# Patient Record
Sex: FEMALE | Race: ASIAN | Employment: UNEMPLOYED | URBAN - METROPOLITAN AREA
[De-identification: names, ages, dates, MRNs, and addresses within clinical notes are randomized per-mention and may not be internally consistent; named-entity substitution may affect disease eponyms.]

---

## 2023-01-25 LAB
HEPATITIS B SURFACE ANTIGEN OB: NEGATIVE
HIV RESULT OB: NEGATIVE
RH FACTOR OB: POSITIVE

## 2023-03-27 ENCOUNTER — OFFICE VISIT (OUTPATIENT)
Dept: PERINATAL CARE | Facility: HOSPITAL | Age: 28
End: 2023-03-27
Attending: OBSTETRICS & GYNECOLOGY
Payer: COMMERCIAL

## 2023-03-27 VITALS
SYSTOLIC BLOOD PRESSURE: 105 MMHG | WEIGHT: 131 LBS | DIASTOLIC BLOOD PRESSURE: 69 MMHG | HEIGHT: 63 IN | BODY MASS INDEX: 23.21 KG/M2 | HEART RATE: 82 BPM

## 2023-03-27 DIAGNOSIS — Z87.74 HX OF CONGENITAL HEART DISEASE: ICD-10-CM

## 2023-03-27 DIAGNOSIS — Z87.74 HX OF CONGENITAL HEART DISEASE: Primary | ICD-10-CM

## 2023-03-27 DIAGNOSIS — Z82.79 FAMILY HISTORY OF CONGENITAL HEART DEFECT: ICD-10-CM

## 2023-03-27 PROCEDURE — 76811 OB US DETAILED SNGL FETUS: CPT | Performed by: OBSTETRICS & GYNECOLOGY

## 2023-03-27 RX ORDER — MELATONIN
325
COMMUNITY

## 2023-03-27 RX ORDER — CHOLECALCIFEROL (VITAMIN D3) 25 MCG
1 TABLET,CHEWABLE ORAL DAILY
COMMUNITY

## 2023-05-23 LAB — HIV RESULT OB: NEGATIVE

## 2023-07-14 ENCOUNTER — OFFICE VISIT (OUTPATIENT)
Dept: PERINATAL CARE | Facility: HOSPITAL | Age: 28
End: 2023-07-14
Attending: OBSTETRICS & GYNECOLOGY
Payer: COMMERCIAL

## 2023-07-14 VITALS
HEIGHT: 63 IN | WEIGHT: 142 LBS | BODY MASS INDEX: 25.16 KG/M2 | SYSTOLIC BLOOD PRESSURE: 107 MMHG | DIASTOLIC BLOOD PRESSURE: 68 MMHG | HEART RATE: 93 BPM

## 2023-07-14 DIAGNOSIS — Z82.79 FAMILY HISTORY OF CONGENITAL HEART DEFECT: ICD-10-CM

## 2023-07-14 DIAGNOSIS — O36.5931 IUGR (INTRAUTERINE GROWTH RESTRICTION) AFFECTING CARE OF MOTHER, THIRD TRIMESTER, FETUS 1: ICD-10-CM

## 2023-07-14 DIAGNOSIS — O36.5931 IUGR (INTRAUTERINE GROWTH RESTRICTION) AFFECTING CARE OF MOTHER, THIRD TRIMESTER, FETUS 1: Primary | ICD-10-CM

## 2023-07-14 PROBLEM — O36.5930 POOR FETAL GROWTH AFFECTING MANAGEMENT OF MOTHER IN THIRD TRIMESTER: Status: ACTIVE | Noted: 2023-07-14

## 2023-07-14 PROCEDURE — 76816 OB US FOLLOW-UP PER FETUS: CPT | Performed by: OBSTETRICS & GYNECOLOGY

## 2023-07-14 PROCEDURE — 76819 FETAL BIOPHYS PROFIL W/O NST: CPT

## 2023-07-14 PROCEDURE — 76820 UMBILICAL ARTERY ECHO: CPT

## 2023-07-18 LAB — STREP GP B CULT OB: NEGATIVE

## 2023-07-26 ENCOUNTER — TELEPHONE (OUTPATIENT)
Dept: OBGYN UNIT | Facility: HOSPITAL | Age: 28
End: 2023-07-26

## 2023-08-03 ENCOUNTER — ANESTHESIA EVENT (OUTPATIENT)
Dept: OBGYN UNIT | Facility: HOSPITAL | Age: 28
End: 2023-08-03
Payer: COMMERCIAL

## 2023-08-03 ENCOUNTER — HOSPITAL ENCOUNTER (INPATIENT)
Facility: HOSPITAL | Age: 28
LOS: 1 days | Discharge: HOME | End: 2023-08-04
Attending: STUDENT IN AN ORGANIZED HEALTH CARE EDUCATION/TRAINING PROGRAM | Admitting: STUDENT IN AN ORGANIZED HEALTH CARE EDUCATION/TRAINING PROGRAM
Payer: COMMERCIAL

## 2023-08-03 ENCOUNTER — ANESTHESIA (OUTPATIENT)
Dept: OBGYN UNIT | Facility: HOSPITAL | Age: 28
End: 2023-08-03
Payer: COMMERCIAL

## 2023-08-03 PROBLEM — Z34.90 PREGNANCY: Status: ACTIVE | Noted: 2023-08-03

## 2023-08-03 LAB
ANTIBODY SCREEN: NEGATIVE
BASOPHILS # BLD AUTO: 0.05 X10(3) UL (ref 0–0.2)
BASOPHILS NFR BLD AUTO: 0.4 %
EOSINOPHIL # BLD AUTO: 0.09 X10(3) UL (ref 0–0.7)
EOSINOPHIL NFR BLD AUTO: 0.8 %
ERYTHROCYTE [DISTWIDTH] IN BLOOD BY AUTOMATED COUNT: 13.5 %
HCT VFR BLD AUTO: 35.2 %
HGB BLD-MCNC: 12.1 G/DL
IMM GRANULOCYTES # BLD AUTO: 0.46 X10(3) UL (ref 0–1)
IMM GRANULOCYTES NFR BLD: 4.1 %
LYMPHOCYTES # BLD AUTO: 1.59 X10(3) UL (ref 1–4)
LYMPHOCYTES NFR BLD AUTO: 14.1 %
MCH RBC QN AUTO: 31.2 PG (ref 26–34)
MCHC RBC AUTO-ENTMCNC: 34.4 G/DL (ref 31–37)
MCV RBC AUTO: 90.7 FL
MONOCYTES # BLD AUTO: 0.88 X10(3) UL (ref 0.1–1)
MONOCYTES NFR BLD AUTO: 7.8 %
NEUTROPHILS # BLD AUTO: 8.19 X10 (3) UL (ref 1.5–7.7)
NEUTROPHILS # BLD AUTO: 8.19 X10(3) UL (ref 1.5–7.7)
NEUTROPHILS NFR BLD AUTO: 72.8 %
PLATELET # BLD AUTO: 290 10(3)UL (ref 150–450)
RBC # BLD AUTO: 3.88 X10(6)UL
RH BLOOD TYPE: POSITIVE
T PALLIDUM AB SER QL IA: NONREACTIVE
WBC # BLD AUTO: 11.3 X10(3) UL (ref 4–11)

## 2023-08-03 PROCEDURE — 0UQKXZZ REPAIR HYMEN, EXTERNAL APPROACH: ICD-10-PCS | Performed by: STUDENT IN AN ORGANIZED HEALTH CARE EDUCATION/TRAINING PROGRAM

## 2023-08-03 PROCEDURE — 86901 BLOOD TYPING SEROLOGIC RH(D): CPT | Performed by: STUDENT IN AN ORGANIZED HEALTH CARE EDUCATION/TRAINING PROGRAM

## 2023-08-03 PROCEDURE — 86850 RBC ANTIBODY SCREEN: CPT | Performed by: STUDENT IN AN ORGANIZED HEALTH CARE EDUCATION/TRAINING PROGRAM

## 2023-08-03 PROCEDURE — 0KQM0ZZ REPAIR PERINEUM MUSCLE, OPEN APPROACH: ICD-10-PCS | Performed by: STUDENT IN AN ORGANIZED HEALTH CARE EDUCATION/TRAINING PROGRAM

## 2023-08-03 PROCEDURE — 86900 BLOOD TYPING SEROLOGIC ABO: CPT | Performed by: STUDENT IN AN ORGANIZED HEALTH CARE EDUCATION/TRAINING PROGRAM

## 2023-08-03 PROCEDURE — 3E033VJ INTRODUCTION OF OTHER HORMONE INTO PERIPHERAL VEIN, PERCUTANEOUS APPROACH: ICD-10-PCS | Performed by: STUDENT IN AN ORGANIZED HEALTH CARE EDUCATION/TRAINING PROGRAM

## 2023-08-03 PROCEDURE — 88307 TISSUE EXAM BY PATHOLOGIST: CPT | Performed by: STUDENT IN AN ORGANIZED HEALTH CARE EDUCATION/TRAINING PROGRAM

## 2023-08-03 PROCEDURE — 10907ZC DRAINAGE OF AMNIOTIC FLUID, THERAPEUTIC FROM PRODUCTS OF CONCEPTION, VIA NATURAL OR ARTIFICIAL OPENING: ICD-10-PCS | Performed by: STUDENT IN AN ORGANIZED HEALTH CARE EDUCATION/TRAINING PROGRAM

## 2023-08-03 PROCEDURE — 85025 COMPLETE CBC W/AUTO DIFF WBC: CPT | Performed by: STUDENT IN AN ORGANIZED HEALTH CARE EDUCATION/TRAINING PROGRAM

## 2023-08-03 PROCEDURE — 86780 TREPONEMA PALLIDUM: CPT | Performed by: STUDENT IN AN ORGANIZED HEALTH CARE EDUCATION/TRAINING PROGRAM

## 2023-08-03 RX ORDER — NALBUPHINE HYDROCHLORIDE 10 MG/ML
2.5 INJECTION, SOLUTION INTRAMUSCULAR; INTRAVENOUS; SUBCUTANEOUS
Status: DISCONTINUED | OUTPATIENT
Start: 2023-08-03 | End: 2023-08-03

## 2023-08-03 RX ORDER — BUPIVACAINE HYDROCHLORIDE 2.5 MG/ML
INJECTION, SOLUTION EPIDURAL; INFILTRATION; INTRACAUDAL AS NEEDED
Status: DISCONTINUED | OUTPATIENT
Start: 2023-08-03 | End: 2023-08-03 | Stop reason: SURG

## 2023-08-03 RX ORDER — ONDANSETRON 2 MG/ML
4 INJECTION INTRAMUSCULAR; INTRAVENOUS EVERY 6 HOURS PRN
Status: DISCONTINUED | OUTPATIENT
Start: 2023-08-03 | End: 2023-08-03 | Stop reason: HOSPADM

## 2023-08-03 RX ORDER — DEXTROSE, SODIUM CHLORIDE, SODIUM LACTATE, POTASSIUM CHLORIDE, AND CALCIUM CHLORIDE 5; .6; .31; .03; .02 G/100ML; G/100ML; G/100ML; G/100ML; G/100ML
INJECTION, SOLUTION INTRAVENOUS AS NEEDED
Status: DISCONTINUED | OUTPATIENT
Start: 2023-08-03 | End: 2023-08-03 | Stop reason: HOSPADM

## 2023-08-03 RX ORDER — SODIUM CHLORIDE, SODIUM LACTATE, POTASSIUM CHLORIDE, CALCIUM CHLORIDE 600; 310; 30; 20 MG/100ML; MG/100ML; MG/100ML; MG/100ML
INJECTION, SOLUTION INTRAVENOUS CONTINUOUS
Status: DISCONTINUED | OUTPATIENT
Start: 2023-08-03 | End: 2023-08-03 | Stop reason: HOSPADM

## 2023-08-03 RX ORDER — BUPIVACAINE HCL/0.9 % NACL/PF 0.25 %
5 PLASTIC BAG, INJECTION (ML) EPIDURAL AS NEEDED
Status: DISCONTINUED | OUTPATIENT
Start: 2023-08-03 | End: 2023-08-03

## 2023-08-03 RX ORDER — SIMETHICONE 80 MG
80 TABLET,CHEWABLE ORAL 3 TIMES DAILY PRN
Status: DISCONTINUED | OUTPATIENT
Start: 2023-08-03 | End: 2023-08-04

## 2023-08-03 RX ORDER — IBUPROFEN 600 MG/1
600 TABLET ORAL EVERY 6 HOURS
Status: DISCONTINUED | OUTPATIENT
Start: 2023-08-03 | End: 2023-08-04

## 2023-08-03 RX ORDER — TERBUTALINE SULFATE 1 MG/ML
0.25 INJECTION, SOLUTION SUBCUTANEOUS AS NEEDED
Status: DISCONTINUED | OUTPATIENT
Start: 2023-08-03 | End: 2023-08-03 | Stop reason: HOSPADM

## 2023-08-03 RX ORDER — DOCUSATE SODIUM 100 MG/1
100 CAPSULE, LIQUID FILLED ORAL
Status: DISCONTINUED | OUTPATIENT
Start: 2023-08-03 | End: 2023-08-04

## 2023-08-03 RX ORDER — IBUPROFEN 600 MG/1
600 TABLET ORAL ONCE AS NEEDED
Status: DISCONTINUED | OUTPATIENT
Start: 2023-08-03 | End: 2023-08-03 | Stop reason: HOSPADM

## 2023-08-03 RX ORDER — TRISODIUM CITRATE DIHYDRATE AND CITRIC ACID MONOHYDRATE 500; 334 MG/5ML; MG/5ML
30 SOLUTION ORAL AS NEEDED
Status: DISCONTINUED | OUTPATIENT
Start: 2023-08-03 | End: 2023-08-03 | Stop reason: HOSPADM

## 2023-08-03 RX ORDER — ACETAMINOPHEN 500 MG
1000 TABLET ORAL EVERY 6 HOURS PRN
Status: DISCONTINUED | OUTPATIENT
Start: 2023-08-03 | End: 2023-08-03 | Stop reason: HOSPADM

## 2023-08-03 RX ORDER — ACETAMINOPHEN 500 MG
500 TABLET ORAL EVERY 6 HOURS PRN
Status: DISCONTINUED | OUTPATIENT
Start: 2023-08-03 | End: 2023-08-03 | Stop reason: HOSPADM

## 2023-08-03 RX ORDER — BISACODYL 10 MG
10 SUPPOSITORY, RECTAL RECTAL ONCE AS NEEDED
Status: DISCONTINUED | OUTPATIENT
Start: 2023-08-03 | End: 2023-08-04

## 2023-08-03 RX ORDER — ACETAMINOPHEN 500 MG
1000 TABLET ORAL EVERY 6 HOURS PRN
Status: DISCONTINUED | OUTPATIENT
Start: 2023-08-03 | End: 2023-08-04

## 2023-08-03 RX ORDER — ACETAMINOPHEN 500 MG
500 TABLET ORAL EVERY 6 HOURS PRN
Status: DISCONTINUED | OUTPATIENT
Start: 2023-08-03 | End: 2023-08-04

## 2023-08-03 RX ADMIN — BUPIVACAINE HYDROCHLORIDE 8 ML: 2.5 INJECTION, SOLUTION EPIDURAL; INFILTRATION; INTRACAUDAL at 11:41:00

## 2023-08-03 NOTE — PLAN OF CARE
Problem: BIRTH - VAGINAL/ SECTION  Goal: Fetal and maternal status remain reassuring during the birth process  Description: INTERVENTIONS:  - Monitor vital signs  - Monitor fetal heart rate  - Monitor uterine activity  - Monitor labor progression (vaginal delivery)  - DVT prophylaxis (C/S delivery)  - Surgical antibiotic prophylaxis (C/S delivery)  8/3/2023 1803 by Anahi Moreno RN  Outcome: Completed  8/3/2023 0951 by Anahi Moreno RN  Outcome: Progressing     Problem: PAIN - ADULT  Goal: Verbalizes/displays adequate comfort level or patient's stated pain goal  Description: INTERVENTIONS:  - Encourage pt to monitor pain and request assistance  - Assess pain using appropriate pain scale  - Administer analgesics based on type and severity of pain and evaluate response  - Implement non-pharmacological measures as appropriate and evaluate response  - Consider cultural and social influences on pain and pain management  - Manage/alleviate anxiety  - Utilize distraction and/or relaxation techniques  - Monitor for opioid side effects  - Notify MD/LIP if interventions unsuccessful or patient reports new pain  - Anticipate increased pain with activity and pre-medicate as appropriate  8/3/2023 1803 by Anahi Moreno RN  Outcome: Completed  8/3/2023 0951 by Anahi Moreno RN  Outcome: Progressing     Problem: ANXIETY  Goal: Will report anxiety at manageable levels  Description: INTERVENTIONS:  - Administer medication as ordered  - Teach and rehearse alternative coping skills  - Provide emotional support with 1:1 interaction with staff  8/3/2023 1803 by Anahi Moreno RN  Outcome: Completed  8/3/2023 0951 by Anahi Moreno RN  Outcome: Progressing     Problem: Patient/Family Goals  Goal: Patient/Family Long Term Goal  Description: Patient's Long Term Goal: to have adequate pain management     Interventions:  -   - See additional Care Plan goals for specific interventions  8/3/2023 1803 by Serena Alvarenga Carolynn Mccauley RN  Outcome: Completed  8/3/2023 0951 by Scarlet Salmon RN  Outcome: Progressing  Goal: Patient/Family Short Term Goal  Description: Patient's Short Term Goal: to have an uncomplicated vaginal delivery     Interventions:   -   - See additional Care Plan goals for specific interventions  8/3/2023 1803 by Scarlet Salmon RN  Outcome: Completed  8/3/2023 0951 by Scarlet Salmon RN  Outcome: Progressing

## 2023-08-03 NOTE — ANESTHESIA PROCEDURE NOTES
Labor Analgesia    Date/Time: 8/3/2023 11:29 AM    Performed by: Jessica Currie MD  Authorized by: Jessica Currie MD      General Information and Staff    Start Time:  8/3/2023 11:29 AM  End Time:  8/3/2023 11:42 AM  Anesthesiologist:  Jessica Currie MD  Performed by:   Anesthesiologist  Patient Location:  OB  Site Identification: surface landmarks    Reason for Block: labor epidural    Preanesthetic Checklist: patient identified, IV checked, risks and benefits discussed, monitors and equipment checked, pre-op evaluation, timeout performed, IV bolus, anesthesia consent and sterile technique used      Procedure Details    Patient Position:  Sitting  Prep: ChloraPrep    Monitoring:  Heart rate and continuous pulse ox  Approach:  Midline    Epidural Needle    Injection Technique:  BRIANNA air  Needle Type:  Tuohy  Needle Gauge:  17 G  Needle Length:  3.375 in  Needle Insertion Depth:  3  Location:  L3-4    Spinal Needle      Catheter    Catheter Type:  End hole  Catheter Size:  19 G  Test Dose:  Negative    Assessment      Additional Comments

## 2023-08-03 NOTE — PLAN OF CARE
Problem: BIRTH - VAGINAL/ SECTION  Goal: Fetal and maternal status remain reassuring during the birth process  Description: INTERVENTIONS:  - Monitor vital signs  - Monitor fetal heart rate  - Monitor uterine activity  - Monitor labor progression (vaginal delivery)  - DVT prophylaxis (C/S delivery)  - Surgical antibiotic prophylaxis (C/S delivery)  Outcome: Progressing     Problem: PAIN - ADULT  Goal: Verbalizes/displays adequate comfort level or patient's stated pain goal  Description: INTERVENTIONS:  - Encourage pt to monitor pain and request assistance  - Assess pain using appropriate pain scale  - Administer analgesics based on type and severity of pain and evaluate response  - Implement non-pharmacological measures as appropriate and evaluate response  - Consider cultural and social influences on pain and pain management  - Manage/alleviate anxiety  - Utilize distraction and/or relaxation techniques  - Monitor for opioid side effects  - Notify MD/LIP if interventions unsuccessful or patient reports new pain  - Anticipate increased pain with activity and pre-medicate as appropriate  Outcome: Progressing     Problem: ANXIETY  Goal: Will report anxiety at manageable levels  Description: INTERVENTIONS:  - Administer medication as ordered  - Teach and rehearse alternative coping skills  - Provide emotional support with 1:1 interaction with staff  Outcome: Progressing     Problem: Patient/Family Goals  Goal: Patient/Family Long Term Goal  Description: Patient's Long Term Goal: to have adequate pain management     Interventions:  -   - See additional Care Plan goals for specific interventions  Outcome: Progressing  Goal: Patient/Family Short Term Goal  Description: Patient's Short Term Goal: to have an uncomplicated vaginal delivery     Interventions:   -   - See additional Care Plan goals for specific interventions  Outcome: Progressing

## 2023-08-03 NOTE — L&D DELIVERY NOTE
[facility] [[id number]]      Labor Events     labor?: No   steroids?: None  Antibiotics received during labor?: No  Rupture date/time: 8/3/2023 1040     Rupture type: AROM  Fluid color: Clear  Labor type: Induced Onset of Labor  Induction: Oxytocin, AROM  Indications for induction: Suspected IUGR  Intrapartum & labor complications: None       Labor Length    1st stage: 4h 22m  2nd stage: 0h 59m  3rd stage: 0h 03m       Labor Event Times    Labor onset date/time: 8/3/2023 1152  Dilation complete date/time: 8/3/2023 1615  Start pushing date/time: 8/3/2023 1620        Presentation    Presentation: Vertex  Position: Left Occiput Anterior       Operative Delivery    Operative Vaginal Delivery: No               Shoulder Dystocia    Shoulder Dystocia: No       Anesthesia    Method: Epidural              [place] Delivery      Delivery date/time:  8/3/23 17:14:00   Delivery type: Normal spontaneous vaginal delivery    Details:            Delivery location: delivery room          Delivery Providers    Delivering Clinician: Yolanda Yañez MD   Delivery personnel:  Provider Role   Burak Patel RN Baby Nurse   Amber Carias RN Delivery Nurse             Cord    Vessels: 3 Vessels  Complications: None  Timed cord clamping: Yes  Time in sec: 30  Cord blood disposition: to lab  Gases sent?: No       Resuscitation    Method: None        Measurements      Weight: 2940 g 6 lb 7.7 oz Length: 50.8 cm     Head circum. : 34 cm Chest circum.: 30 cm      Abdominal circum.: 29.5 cm           Placenta    Date/time: 8/3/2023 1717  Removal: Spontaneous  Appearance: Intact  Disposition: Pathology       Apgars    Living status: Living   Apgar Scoring Key:    0 1 2    Skin color Blue or pale Acrocyanotic Completely pink    Heart rate Absent <100 bpm >100 bpm    Reflex irritability No response Grimace Cry or active withdrawal    Muscle tone Limp Some flexion Active motion    Respiratory effort Absent Weak cry; hypoventilation Good, crying                1 Minute:  5 Minute:  10 Minute:  15 Minute:  20 Minute:      Skin color: 0  1       Heart rate: 2  2       Reflex irritablity: 2  2       Muscle tone: 2  2       Respiratory effort: 2  2       Total: 8  9               Apgars assigned by: LISA BENÍTEZ RN     [place] disposition: with mother             Skin to Skin    Skin to skin initiated date/time: 8/3/2023 1750  Skin to skin with: Mother       Vaginal Count    Initial count RN: Jorge Lynne RN  Initial count Tech: Patricia Reeder   Svitlana    Initial counts 11   0    Final counts 11   2    Final count RN: Jorge Lynne RN  Final count MD: Nirali Overton MD       Delivery (Maternal)    Episiotomy: None  Perineal lacerations: 2nd Repaired?: Yes     Vaginal laceration?: Yes Repaired?: Yes   Quantitative blood loss (mL): 141                                                                      Vaginal Delivery Note          Meridee Loss Patient Status:  Inpatient    2/15/1995 MRN [id number]   Location [facility] Attending Nirali Overton MD   Hosp Day # 0 PCP No primary care provider on file. Preprocedure Dx:  IUP at 38w4d, IOL for IUGR with AC 4%, history of congenital heart defect s/p surgical repair at 11years old, low weight gain in pregnancy, anemia on oral iron    Post Procedure Dx: Same - delivered, velamentous cord    Procedure: Normal Spontaneous Vaginal Delivery    Physician: Raphael Mohamud MD    Anesthesia: Epidural    QBL: 141 cc    Findings:   1) A viable male infant with Apgars of 8 and 9 weighing 6lbs 7.7 oz was delivered in ANA position. 2) 3 vessel cord.    3) Normal appearing placenta spontaneously delivered intact, velamentous cord   4) 2nd degree laceration, bilateral vaginal lacerations of the hymenal ring at 1 o'clock and 10 o'clock    Procedure:  Patient is a 27y/o  who presented at 38w4d gestation for induction of labor per [facility] recommendation due to fetal growth restriction with AC 4%. Patient was admitted to labor and delivery. Pitocin was started per protocol as her cervix in the office was 3cm dilated one day prior. AROM was performed at [id number] with clear fluid. Her cervical exam was 4/60/-2 at that time. Epidural was placed per patient request. Her labor progressed, and upon complete dilation at 1615 she was encouraged to push. The patient pushed for approximately 54 minutes. As the head was delivered, the perineum was supported to decrease the risk of tearing. The infant was delivered in the ANA position. The shoulders rotated easily, and the anterior shoulder delivered followed by the posterior shoulder and remainder of the infant. The infant's mouth and nose were bulb suctioned. The cord was doubly clamped and cut after a 38 second delay. The baby was placed on the mother's abdomen vigorous and crying. The cord blood was collected, and the placenta spontaneously delivered intact shortly thereafter. Bimanual exam was performed to the fundus. Retained products were not palpated. Examination of the cervix, vagina, and perineum demonstrated a 2nd degree perineal laceration and bilateral vaginal lacerations near the hymenal ring at 1 o'clock and 10 o'clock. The perineal laceration was repaired using 2-0 vicryl in a running locked fashion from the apex of the tear to the level of the hymenal ring. A deep crown stitch was placed, and the perineal body was re-approximated using 2-0 vicryl in an running fashion. The skin was re-approximated using 2-0 vicryl in a subcuticular fashion. The right hymenal ring tear was approximated with 3-0 vicryl in a figure of eight suture. The left vaginal laceration was repaired with running 3-0 vicryl. Bleeding was minimal. The patient was then moved to the supine position in stable condition. Counts were correct.     Complications:  None    Prateek Mills MD  8/3/2023  6:10 PM

## 2023-08-03 NOTE — PROGRESS NOTES
Pt is a 29year old female admitted to 115/115-A. Patient presents with:  Scheduled Induction: 0930 IOL for IUGR. FGR noted at 4th percentile for abdominal circumference      Pt is  38w4d intra-uterine pregnancy. History obtained, consents signed. Oriented to room, staff, and plan of care.

## 2023-08-04 VITALS
RESPIRATION RATE: 16 BRPM | OXYGEN SATURATION: 100 % | TEMPERATURE: 98 F | HEART RATE: 99 BPM | SYSTOLIC BLOOD PRESSURE: 104 MMHG | DIASTOLIC BLOOD PRESSURE: 67 MMHG

## 2023-08-04 LAB
BASOPHILS # BLD AUTO: 0.02 X10(3) UL (ref 0–0.2)
BASOPHILS NFR BLD AUTO: 0.1 %
EOSINOPHIL # BLD AUTO: 0.1 X10(3) UL (ref 0–0.7)
EOSINOPHIL NFR BLD AUTO: 0.7 %
ERYTHROCYTE [DISTWIDTH] IN BLOOD BY AUTOMATED COUNT: 13.6 %
HCT VFR BLD AUTO: 29.1 %
HGB BLD-MCNC: 9.8 G/DL
IMM GRANULOCYTES # BLD AUTO: 0.12 X10(3) UL (ref 0–1)
IMM GRANULOCYTES NFR BLD: 0.9 %
LYMPHOCYTES # BLD AUTO: 1.49 X10(3) UL (ref 1–4)
LYMPHOCYTES NFR BLD AUTO: 10.7 %
MCH RBC QN AUTO: 30.4 PG (ref 26–34)
MCHC RBC AUTO-ENTMCNC: 33.7 G/DL (ref 31–37)
MCV RBC AUTO: 90.4 FL
MONOCYTES # BLD AUTO: 1.36 X10(3) UL (ref 0.1–1)
MONOCYTES NFR BLD AUTO: 9.8 %
NEUTROPHILS # BLD AUTO: 10.82 X10 (3) UL (ref 1.5–7.7)
NEUTROPHILS # BLD AUTO: 10.82 X10(3) UL (ref 1.5–7.7)
NEUTROPHILS NFR BLD AUTO: 77.8 %
PLATELET # BLD AUTO: 244 10(3)UL (ref 150–450)
RBC # BLD AUTO: 3.22 X10(6)UL
WBC # BLD AUTO: 13.9 X10(3) UL (ref 4–11)

## 2023-08-04 PROCEDURE — 85025 COMPLETE CBC W/AUTO DIFF WBC: CPT | Performed by: STUDENT IN AN ORGANIZED HEALTH CARE EDUCATION/TRAINING PROGRAM

## 2023-08-04 RX ORDER — IBUPROFEN 600 MG/1
600 TABLET ORAL EVERY 6 HOURS
Qty: 30 TABLET | Refills: 0 | Status: SHIPPED | OUTPATIENT
Start: 2023-08-04

## 2023-08-04 NOTE — PROGRESS NOTES
Patient admitted via wheelchair. ID bands verified. Oriented to room. Safety precautions are initiated. Bed in low position. Teaching initiated. Call light within reach.